# Patient Record
Sex: FEMALE | Race: WHITE | URBAN - METROPOLITAN AREA
[De-identification: names, ages, dates, MRNs, and addresses within clinical notes are randomized per-mention and may not be internally consistent; named-entity substitution may affect disease eponyms.]

---

## 2017-09-14 ENCOUNTER — OFFICE VISIT (OUTPATIENT)
Dept: ORTHOPEDIC SURGERY | Age: 57
End: 2017-09-14

## 2017-09-14 VITALS
DIASTOLIC BLOOD PRESSURE: 70 MMHG | HEART RATE: 90 BPM | WEIGHT: 175.04 LBS | BODY MASS INDEX: 29.88 KG/M2 | HEIGHT: 64 IN | SYSTOLIC BLOOD PRESSURE: 111 MMHG

## 2017-09-14 DIAGNOSIS — M67.971 ACHILLES TENDON DISORDER, RIGHT: ICD-10-CM

## 2017-09-14 DIAGNOSIS — M79.672 FOOT PAIN, LEFT: Primary | ICD-10-CM

## 2017-09-14 PROCEDURE — 99213 OFFICE O/P EST LOW 20 MIN: CPT | Performed by: ORTHOPAEDIC SURGERY

## 2017-09-14 PROCEDURE — 73620 X-RAY EXAM OF FOOT: CPT | Performed by: ORTHOPAEDIC SURGERY

## 2019-05-04 ENCOUNTER — OFFICE VISIT (OUTPATIENT)
Dept: ORTHOPEDIC SURGERY | Age: 59
End: 2019-05-04
Payer: COMMERCIAL

## 2019-05-04 VITALS — HEIGHT: 65 IN | WEIGHT: 175 LBS | RESPIRATION RATE: 16 BRPM | BODY MASS INDEX: 29.16 KG/M2

## 2019-05-04 DIAGNOSIS — M25.571 RIGHT ANKLE PAIN, UNSPECIFIED CHRONICITY: Primary | ICD-10-CM

## 2019-05-04 DIAGNOSIS — S93.491A SPRAIN OF ANTERIOR TALOFIBULAR LIGAMENT OF RIGHT ANKLE, INITIAL ENCOUNTER: ICD-10-CM

## 2019-05-04 PROCEDURE — 1036F TOBACCO NON-USER: CPT | Performed by: NURSE PRACTITIONER

## 2019-05-04 PROCEDURE — G8419 CALC BMI OUT NRM PARAM NOF/U: HCPCS | Performed by: NURSE PRACTITIONER

## 2019-05-04 PROCEDURE — G8427 DOCREV CUR MEDS BY ELIG CLIN: HCPCS | Performed by: NURSE PRACTITIONER

## 2019-05-04 PROCEDURE — 99213 OFFICE O/P EST LOW 20 MIN: CPT | Performed by: NURSE PRACTITIONER

## 2019-05-04 PROCEDURE — 3017F COLORECTAL CA SCREEN DOC REV: CPT | Performed by: NURSE PRACTITIONER

## 2019-05-04 RX ORDER — ESTRADIOL 2 MG/1
2 TABLET ORAL DAILY
COMMUNITY

## 2019-05-04 RX ORDER — METHYLPREDNISOLONE 4 MG/1
TABLET ORAL
Qty: 1 KIT | Refills: 0 | Status: SHIPPED | OUTPATIENT
Start: 2019-05-04 | End: 2019-05-04 | Stop reason: CLARIF

## 2019-05-04 RX ORDER — PANTOPRAZOLE SODIUM 20 MG/1
20 TABLET, DELAYED RELEASE ORAL DAILY
COMMUNITY

## 2019-05-04 RX ORDER — LEVOTHYROXINE AND LIOTHYRONINE 38; 9 UG/1; UG/1
60 TABLET ORAL DAILY
COMMUNITY

## 2019-05-04 RX ORDER — FOLIC ACID 1 MG/1
1 TABLET ORAL DAILY
COMMUNITY

## 2019-05-04 RX ORDER — GABAPENTIN 100 MG/1
100 CAPSULE ORAL 3 TIMES DAILY
COMMUNITY

## 2019-05-04 RX ORDER — PRASTERONE (DHEA) 50 MG
CAPSULE ORAL
COMMUNITY

## 2019-05-04 RX ORDER — MELOXICAM 15 MG/1
15 TABLET ORAL DAILY
COMMUNITY

## 2019-05-04 NOTE — PROGRESS NOTES
CHIEF COMPLAINT:    Chief Complaint   Patient presents with    Ankle Pain     Right       HISTORY OF PRESENT ILLNESS:                The patient is a 64 y.o. female who is here for evaluation of right ankle pain. He said his anemia and states yesterday she was carrying laundry and missed 2 steps. She states she landed on her front side. She states she initially had some pain in her right ankle with some swelling followed by bruising. She states she has pain with severe flexion and extension. She states she is able walk on it as long as she keeps her ankle still she does not have a lot of discomfort. She states she is prediabetic.   Past Medical History:   Diagnosis Date    Back injury     Pericarditis 2013    Had on four different occaions last one 2013    Thyroid disease           The pain assessment was noted & is as follows:  Pain Assessment  Location of Pain: Ankle  Location Modifiers: Right, Lateral  Severity of Pain: 9  Quality of Pain: Sharp  Duration of Pain: A few days  Frequency of Pain: Constant  Date Pain First Started: 05/03/19  Aggravating Factors: Walking, Standing, Squatting  Limiting Behavior: Yes  Result of Injury: Yes  Work-Related Injury: No  Are there other pain locations you wish to document?: No]      Work Status/Functionality:     Past Medical History: Medical history form was reviewed today & can be found in the media tab  Past Medical History:   Diagnosis Date    Back injury     Pericarditis 2013    Had on four different occaions last one 2013    Thyroid disease       Past Surgical History:     Past Surgical History:   Procedure Laterality Date    ACHILLES TENDON SURGERY Left 2/9/15    left achillesdebrid, haglands excisflexor, hallicis longus transfer    ADENOIDECTOMY      FOOT SURGERY      bilat heels    MANDIBLE FRACTURE SURGERY      OTHER SURGICAL HISTORY      left trigger thumb    SPINE SURGERY      11 total spinal surgeries    TONSILLECTOMY       Current Medications:     Current Outpatient Medications:     folic acid (FOLVITE) 1 MG tablet, Take 1 mg by mouth daily, Disp: , Rfl:     meloxicam (MOBIC) 15 MG tablet, Take 15 mg by mouth daily, Disp: , Rfl:     pantoprazole (PROTONIX) 20 MG tablet, Take 20 mg by mouth daily, Disp: , Rfl:     metFORMIN (GLUCOPHAGE) 500 MG tablet, Take 500 mg by mouth 2 times daily (with meals), Disp: , Rfl:     Progesterone 200 MG SUPP, Place vaginally, Disp: , Rfl:     DHEA 50 MG CAPS, Take by mouth, Disp: , Rfl:     estradiol (ESTRACE) 2 MG tablet, Take 2 mg by mouth daily, Disp: , Rfl:     gabapentin (NEURONTIN) 100 MG capsule, Take 100 mg by mouth 3 times daily. , Disp: , Rfl:     thyroid (ARMOUR) 60 MG tablet, Take 60 mg by mouth daily, Disp: , Rfl:     Calcium Carbonate-Vitamin D (CALCIUM + D PO), Take  by mouth daily. , Disp: , Rfl:     DULoxetine (CYMBALTA) 60 MG capsule, Take 60 mg by mouth 2 times daily , Disp: , Rfl:     valACYclovir (VALTREX) 500 MG tablet, Take 500 mg by mouth daily. , Disp: , Rfl:     oxyCODONE-acetaminophen (PERCOCET) 5-325 MG per tablet, Take 1 tablet by mouth 2 times daily Oh CTP RX [id number], Disp: 60 tablet, Rfl: 0    oxyCODONE-acetaminophen (PERCOCET) 5-325 MG per tablet, Take 1 tablet by mouth 2 times daily Oh CTP RX [id number] Fill after 9/28/16, Disp: 60 tablet, Rfl: 0    methocarbamol (ROBAXIN) 750 MG tablet, Take 750 mg by mouth 4 times daily as needed. , Disp: , Rfl:   Allergies:  Vicodin [hydrocodone-acetaminophen] and Codeine  Social History:    reports that she has never smoked. She has never used smokeless tobacco. She reports that she drinks alcohol. She reports that she does not use drugs. Family History:   Family History   Problem Relation Age of Onset    Cancer Father     Substance Abuse Brother        REVIEW OF SYSTEMS:   For new problems, a full review of systems will be found scanned in the patient's chart.   CONSTITUTIONAL: Denies unexplained weight loss, fevers, chills NEUROLOGICAL: Denies unsteady gait or progressive weakness  SKIN: Denies skin changes, delayed healing, rash, itching       PHYSICAL EXAM:    Vitals: Resp. rate 16, height 5' 5\" (1.651 m), weight 175 lb (79.4 kg), not currently breastfeeding. GENERAL EXAM:  · General Apparence: Patient is adequately groomed with no evidence of malnutrition. · Orientation: The patient is oriented to time, place and person. · Mood & Affect:The patient's mood and affect are appropriate       Right ankle PHYSICAL EXAMINATION:  · Inspection:  No visual deformity. Moderate effusion and mild ecchymosis. No erythema. · Palpation:  Tenderness to palpation lateral malleolus      · Range of Motion: Range of motion limited due to swelling and discomfort    · Strength: No strength deficits    · Special Tests:  EHL intact. Capillary refill less than 3 seconds. Negative Homans. Discomfort with plantarflexion and dorsiflexion abduction and adduction            · Skin:  There are no rashes, ulcerations or lesions. · There are no dysvascular changes     Gait & station: antalgic      Additional Examinations:        Left Lower Extremity: Examination of the left lower extremity does not show any tenderness, deformity or injury. Range of motion is unremarkable. There is no gross instability. There are no rashes, ulcerations or lesions. Strength and tone are normal.      Diagnostic Testing: The following x rays were read and interpreted by myself      1. 3 views of right ankle shows    Orders     Orders Placed This Encounter   Procedures    XR ANKLE RIGHT (MIN 3 VIEWS)     Standing Status:   Future     Number of Occurrences:   1     Standing Expiration Date:   6/4/2019         Assessment / Treatment Plan:     1. Right ankle sprain    At this point patient was advised to take OTC ibuprofen alternating with Tylenol if no contraindications as directed per package insert.   Patient was offered a tall walking boot which she would like to try to get off-line at this time due to the cost here at the clinic. Patient advised to continue to rest, ice, compress with Ace wrap and elevate. She will follow up in 2 weeks with one of our foot and ankle specialists [place]. Patient understands and agrees with plan of care.

## 2022-10-18 ENCOUNTER — HOSPITAL ENCOUNTER (OUTPATIENT)
Dept: PHYSICAL THERAPY | Age: 62
Setting detail: THERAPIES SERIES
Discharge: HOME | End: 2022-10-18
Payer: COMMERCIAL

## 2022-10-18 PROCEDURE — 97112 NEUROMUSCULAR REEDUCATION: CPT

## 2022-10-18 PROCEDURE — 97161 PT EVAL LOW COMPLEX 20 MIN: CPT
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
## 2022-10-18 NOTE — FLOWSHEET NOTE
Physical Therapy Aquatic Flow Sheet  Date:  10/18/2022    Patient Name:  Rikki Lee    Restrictions:    Medical/Treatment Diagnosis Information:   Intertrochanteric fracture of left femur, closed, with delayed healing, subsequent encounter [Q87.289O]  Insurance/Certification information:   Med [place]  Physician Information:   Charisse Davidson MD  Plan of care signed (Y/N):  N  Visit# / total visits:  1/12    Pain level: /10   Electronically signed by:  Christ Armendariz PT, PT    Medicare Cap (if applicable):  N/a = total amount used, updated 10/18/2022    Key  B= Belt DB= Dumbells T= Theratube   H= Hydrotone N= Noodles W= Weights   P= Paddles S= Speedo equipment K= Kickboard     Exercises/Activities   Warm-up/Amb    Exercises      Slow forward  nv  HR/TR  nv    Slow sideways  nv  Marches  nv    Slow backwards  nv  Mini-squats  nv    Medium forward    4-way SLR  nv    Medium sideways    Hip circles/fig 8  nv    Small shuffle    Hamstring curls  nv    Jog    Knee extension  nv    Braiding    Pelvic tilts  nv    Bicycling  nv  Scap squeezes          Shoulder flex/ext      Functional    Shoulder abd/add      Step    Shoulder H. abd/add      Lifting    Shoulder IR/ER      Hand to opp knee    Rowing      Push down squat    Bilateral pull down      UE PNF    Push/pull      LE PNF    Push downs      Wall push ups    Arm circles      SLS  nv  Elbow flex/ext          Chin tuck      Stretching    UT shrugs/rolls      Gastroc/Soleus  nv  Rocking horse      Hamstring          SKTC  nv  Other      Piriformis    tandem  nv    Hip flexor          Ladder pull          Pec stretch          Post deltoid           Time In:      Timed Code Treatment Minutes:       Total Treatment Minutes:      Treatment/Activity Tolerance:   [] Patient tolerated treatment well [] Patient limited by fatigue   [] Patient limited by pain [] Patient limited by other medical complications  [] Other:     Prognosis: [] Good [] Fair  [] Poor    Patient Requires Follow-up:  [] Yes  [] No    Plan: [] Continue per plan of care [] Alter current plan (see comments)   [] Plan of care initiated [] Hold pending MD visit [] Discharge    See Weekly Progress Note: [] Yes  [] No  Next due:

## 2022-10-20 ENCOUNTER — HOSPITAL ENCOUNTER (OUTPATIENT)
Dept: PHYSICAL THERAPY | Age: 62
Setting detail: THERAPIES SERIES
Discharge: HOME | End: 2022-10-20
Payer: COMMERCIAL

## 2022-10-20 PROCEDURE — 97150 GROUP THERAPEUTIC PROCEDURES: CPT

## 2022-10-20 PROCEDURE — 97113 AQUATIC THERAPY/EXERCISES: CPT

## 2022-10-20 NOTE — FLOWSHEET NOTE
[facility], [facility], [address] [place] [address], [address] [place] [address]   Phone: [phone]  Fax [phone]      Physical Therapy Aquatic Flow Sheet  Date:  10/20/2022    Patient Name:  Inna Cramer    Restrictions:    Medical/Treatment Diagnosis Information:   Intertrochanteric fracture of left femur, closed, with delayed healing, subsequent encounter [Y07.805X]  Insurance/Certification information:   Med [place]  Physician Information:   Jamarcus Buckley MD  Plan of care signed (Y/N):  N  Visit# / total visits:  2/12    Pain level: 2/10   Electronically signed by:  Isabell Cai, PT, DPT    Medicare Cap (if applicable):  N/a = total amount used, updated 10/20/2022    Key  B= Belt DB= Dumbells T= Theratube   H= Hydrotone N= Noodles W= Weights   P= Paddles S= Speedo equipment K= Kickboard     Exercises/Activities   Warm-up/Amb    Exercises      Slow forward  x2laps  HR/TR  x10B    Slow sideways  x2laps  Marches  x10    Slow backwards  x2laps  Mini-squats  nv    Medium forward    3-way SLR  x10B    Medium sideways    Hip circles/fig 8  x10B    Small shuffle    Hamstring curls  x10B    Jog    Knee extension  x10B    Braiding    Pelvic tilts  5\"x10    Bicycling  x2min  Scap squeezes          Shoulder flex/ext      Functional    Shoulder abd/add      Step    Shoulder H. abd/add      Lifting    Shoulder IR/ER      Hand to opp knee    Rowing      Push down squat    Bilateral pull down      UE PNF    Push/pull      LE PNF    Push downs      Wall push ups    Arm circles      SLS  nv  Elbow flex/ext          Chin tuck      Stretching    UT shrugs/rolls      Gastroc/Soleus  [id number] B  Rocking horse      Hamstring  [id number] B        SKTC  nv  Other      Piriformis    tandem  [id number] B    Hip flexor          Ladder pull          Pec stretch          Post deltoid           Time In:  2:10pm    Timed Code Treatment Minutes:  15min.     Total Treatment Minutes:  35min (1 group, 1 aquatic individual TE)    Treatment/Activity Tolerance:   [x] Patient tolerated treatment well [] Patient limited by fatigue   [] Patient limited by pain [] Patient limited by other medical complications  [] Other:     Prognosis: [x] Good [] Fair  [] Poor    Patient Requires Follow-up:  [x] Yes  [] No    Plan: [x] Continue per plan of care [] Alter current plan (see comments)   [] Plan of care initiated [] Hold pending MD visit [] Discharge    See Weekly Progress Note: [] Yes  [x] No  Next due:

## 2022-10-25 ENCOUNTER — HOSPITAL ENCOUNTER (OUTPATIENT)
Dept: PHYSICAL THERAPY | Age: 62
Setting detail: THERAPIES SERIES
Discharge: HOME | End: 2022-10-25
Payer: COMMERCIAL

## 2022-10-25 PROCEDURE — 97150 GROUP THERAPEUTIC PROCEDURES: CPT

## 2022-10-25 PROCEDURE — 97113 AQUATIC THERAPY/EXERCISES: CPT

## 2022-10-25 NOTE — FLOWSHEET NOTE
[facility], [facility], [address] [place] [address]   Phone: [phone]  Fax [phone]      Physical Therapy Aquatic Flow Sheet  Date:  10/25/2022    Patient Name:  Diego Salvador    Restrictions:    Medical/Treatment Diagnosis Information:   Intertrochanteric fracture of left femur, closed, with delayed healing, subsequent encounter [T24.951Y]  Insurance/Certification information:   Med [place]  Physician Information:   Alicia Fan MD  Plan of care signed (Y/N):  N  Visit# / total visits:  3/12    Pain level: 2/10   Electronically signed by:  Arturo Cohn, PT, DPT    Medicare Cap (if applicable):  N/a = total amount used, updated 10/25/2022    Key  B= Belt DB= Dumbells T= Theratube   H= Hydrotone N= Noodles W= Weights   P= Paddles S= Speedo equipment K= Kickboard     Exercises/Activities   Warm-up/Amb    Exercises      Slow forward  x2laps  HR/TR  x10B    Slow sideways  x2laps  Marches  x10    Slow backwards  x2laps  Mini-squats  x20    Medium forward    3-way SLR  x15B    Medium sideways    Hip circles/fig 8  x15B    Small shuffle    Hamstring curls  x15B    Jog    Knee extension  x15B    Braiding    Pelvic tilts  5\"x10    Bicycling  x2min  Scap squeezes          Shoulder flex/ext      Functional    Shoulder abd/add      Step    Shoulder H. abd/add      Lifting    Shoulder IR/ER      Hand to opp knee    Rowing      Push down squat    Bilateral pull down      UE PNF    Push/pull      LE PNF    Push downs      Wall push ups    Arm circles      SLS  nv  Elbow flex/ext          Chin tuck      Stretching    UT shrugs/rolls      Gastroc/Soleus  [id number] B  Rocking horse      Hamstring  [id number] B        SKTC  nv  Other      Piriformis    tandem  [id number] B    Hip flexor          Ladder pull          Pec stretch          Post deltoid           Time In:  2:10pm    Timed Code Treatment Minutes:  15min.     Total Treatment Minutes: 35min (1 group, 1 aquatic individual TE)    Treatment/Activity Tolerance:   [x] Patient tolerated treatment well [] Patient limited by fatigue   [] Patient limited by pain [] Patient limited by other medical complications  [] Other:     Prognosis: [x] Good [] Fair  [] Poor    Patient Requires Follow-up:  [x] Yes  [] No    Plan: [x] Continue per plan of care [] Alter current plan (see comments)   [] Plan of care initiated [] Hold pending MD visit [] Discharge    See Weekly Progress Note: [] Yes  [x] No  Next due:

## 2022-10-27 ENCOUNTER — HOSPITAL ENCOUNTER (OUTPATIENT)
Dept: PHYSICAL THERAPY | Age: 62
Setting detail: THERAPIES SERIES
Discharge: HOME | End: 2022-10-27
Payer: COMMERCIAL

## 2022-10-27 PROCEDURE — 97113 AQUATIC THERAPY/EXERCISES: CPT

## 2022-10-27 PROCEDURE — 97150 GROUP THERAPEUTIC PROCEDURES: CPT

## 2022-10-27 NOTE — FLOWSHEET NOTE
[facility], [facility], [address] [place] [address], [address] [place] [address]   Phone: [phone]  Fax [phone]      Physical Therapy Aquatic Flow Sheet  Date:  10/27/2022    Patient Name:  Ophelia Shaw    Restrictions:    Medical/Treatment Diagnosis Information:   Intertrochanteric fracture of left femur, closed, with delayed healing, subsequent encounter [J24.261V]  Insurance/Certification information:   Med [facility]  Physician Information:   Samara Harden MD  Plan of care signed (Y/N):  N  Visit# / total visits:  4/12    Pain level: 0/10   Electronically signed by:  Terrell Freeman, PT, DPT    Medicare Cap (if applicable):  N/a = total amount used, updated 10/27/2022    Key  B= Belt DB= Dumbells T= Theratube   H= Hydrotone N= Noodles W= Weights   P= Paddles S= Speedo equipment K= Kickboard     Exercises/Activities   Warm-up/Amb    Exercises      Slow forward  x2laps  HR/TR  x15B    Slow sideways  x2laps  Marches  x2min    Slow backwards  x2laps  Mini-squats  x20    Medium forward    3-way SLR  x15B    Medium sideways    Hip circles/fig 8  x15B    Small shuffle    Hamstring curls  x15B    Jog    Knee extension  x15B    Braiding    Pelvic tilts  5\"x10    Bicycling  x2min  Scap squeezes          Shoulder flex/ext  x10B w/ TA    Functional    Shoulder abd/add  x10B w/ TA    Step    Shoulder H. abd/add  x10B w/ TA    Lifting    Shoulder IR/ER      Hand to opp knee    Rowing      Push down squat    Bilateral pull down      UE PNF    Push/pull      LE PNF    Push downs      Wall push ups    Arm circles      SLS    Elbow flex/ext          Chin tuck      Stretching    UT shrugs/rolls      Gastroc/Soleus  [id number] B  Rocking horse      Hamstring  [id number] B        SKTC  nv  Other      Piriformis    tandem  [id number] B    Hip flexor          Ladder pull          Pec stretch          Post deltoid           Time In:  2:00pm    Timed Code Treatment Minutes: 15min.     Total Treatment Minutes:  45min (1 group, 1 aquatic individual TE)    Treatment/Activity Tolerance:   [x] Patient tolerated treatment well [] Patient limited by fatigue   [] Patient limited by pain [] Patient limited by other medical complications  [] Other:     Prognosis: [x] Good [] Fair  [] Poor    Patient Requires Follow-up:  [x] Yes  [] No    Plan: [x] Continue per plan of care [] Alter current plan (see comments)   [] Plan of care initiated [] Hold pending MD visit [] Discharge    See Weekly Progress Note: [] Yes  [x] No  Next due:

## 2022-11-01 ENCOUNTER — HOSPITAL ENCOUNTER (OUTPATIENT)
Dept: PHYSICAL THERAPY | Age: 62
Setting detail: THERAPIES SERIES
Discharge: HOME | End: 2022-11-01
Payer: COMMERCIAL

## 2022-11-01 PROCEDURE — 97150 GROUP THERAPEUTIC PROCEDURES: CPT

## 2022-11-01 PROCEDURE — 97113 AQUATIC THERAPY/EXERCISES: CPT

## 2022-11-01 NOTE — FLOWSHEET NOTE
[facility], [facility], [address] [place] [address] Dr  Phone: [phone]  Fax [phone]      Physical Therapy Aquatic Flow Sheet  Date:  11/1/2022    Patient Name:  Candelaria Fuentes    Restrictions:    Medical/Treatment Diagnosis Information:   Intertrochanteric fracture of left femur, closed, with delayed healing, subsequent encounter [J00.351L]  Insurance/Certification information:   Med [place]  Physician Information:   Sunshine Biswas MD  Plan of care signed (Y/N):  N  Visit# / total visits:  5/12    Pain level: 0/10   Electronically signed by:  To Vasquez, PT, DPT    Medicare Cap (if applicable):  N/a = total amount used, updated 11/1/2022    Key  B= Belt DB= Dumbells T= Theratube   H= Hydrotone N= Noodles W= Weights   P= Paddles S= Speedo equipment K= Kickboard     Exercises/Activities   Warm-up/Amb    Exercises      Slow forward  x2laps  HR/TR  x15B    Slow sideways  x2laps  Marches  x2min    Slow backwards  x2laps  Mini-squats  x20    Medium forward    3-way SLR  x15B    Medium sideways    Hip circles/fig 8  x15B    Small shuffle    Hamstring curls  x15B    Jog    Knee extension  x15B    Braiding    Pelvic tilts  5\"x10    Bicycling  x2min  Scap squeezes          Shoulder flex/ext  x10B w/ TA    Functional    Shoulder abd/add  x10B w/ TA    Step    Shoulder H. abd/add  x10B w/ TA    Lifting    Shoulder IR/ER      Hand to opp knee    Rowing      Push down squat    Bilateral pull down      UE PNF    Push/pull      LE PNF    Push downs      Wall push ups    Arm circles      SLS    Elbow flex/ext          Chin tuck      Stretching    UT shrugs/rolls      Gastroc/Soleus  [id number] B  Rocking horse      Hamstring  [id number] B        SKTC  nv  Other      Piriformis    tandem  [id number] B    Hip flexor          Ladder pull          Pec stretch          Post deltoid           Time In:  2:00pm    Timed Code Treatment Minutes: 15min.     Total Treatment Minutes:  45min (1 group, 1 aquatic individual TE)    Treatment/Activity Tolerance:   [x] Patient tolerated treatment well [] Patient limited by fatigue   [] Patient limited by pain [] Patient limited by other medical complications  [] Other:     Prognosis: [x] Good [] Fair  [] Poor    Patient Requires Follow-up:  [x] Yes  [] No    Plan: [x] Continue per plan of care [] Alter current plan (see comments)   [] Plan of care initiated [] Hold pending MD visit [] Discharge    See Weekly Progress Note: [] Yes  [x] No  Next due:

## 2022-11-03 ENCOUNTER — HOSPITAL ENCOUNTER (OUTPATIENT)
Dept: PHYSICAL THERAPY | Age: 62
Setting detail: THERAPIES SERIES
Discharge: HOME | End: 2022-11-03
Payer: COMMERCIAL

## 2022-11-03 PROCEDURE — 97150 GROUP THERAPEUTIC PROCEDURES: CPT

## 2022-11-03 PROCEDURE — 97113 AQUATIC THERAPY/EXERCISES: CPT

## 2022-11-03 NOTE — FLOWSHEET NOTE
[facility], [facility], [address] [place] [address] Dr  Phone: [phone]  Fax [phone]      Physical Therapy Aquatic Flow Sheet  Date:  11/3/2022    Patient Name:  Rikki Ford    Restrictions:    Medical/Treatment Diagnosis Information:   Intertrochanteric fracture of left femur, closed, with delayed healing, subsequent encounter [M82.229T]  Insurance/Certification information:   Med [place]  Physician Information:   Benedict Schwab, MD  Plan of care signed (Y/N):  N  Visit# / total visits:  6/12    Pain level: 0/10   Electronically signed by:  Rey Hernández PT, DPT    Medicare Cap (if applicable):  N/a = total amount used, updated 11/3/2022    Key  B= Belt DB= Dumbells T= Theratube   H= Hydrotone N= Noodles W= Weights   P= Paddles S= Speedo equipment K= Kickboard     Exercises/Activities   Warm-up/Amb    Exercises      Slow forward  x2laps  HR/TR  x15B    Slow sideways  x2laps  Marches  x2min    Slow backwards  x2laps  Mini-squats  x20    Medium forward    3-way SLR  x20B    Medium sideways    Hip circles/fig 8  x20B    Small shuffle    Hamstring curls  x15B    Jog    Knee extension  x15B    Braiding    Pelvic tilts  5\"x10    Bicycling  x2min  Scap squeezes  5\"x10        Shoulder flex/ext  x15B w/ TA    Functional    Shoulder abd/add  x15B w/ TA    Step    Shoulder H. abd/add  x15B w/ TA    Lifting    Shoulder IR/ER      Hand to opp knee    Rowing      Push down squat    Bilateral pull down      UE PNF    Push/pull      LE PNF    Push downs      Wall push ups    Arm circles      SLS    Elbow flex/ext          Chin tuck      Stretching    UT shrugs/rolls      Gastroc/Soleus  [id number] B  Rocking horse      Hamstring  [id number] B        SKTC  nv  Other      Piriformis    tandem  [id number] B    Hip flexor          Ladder pull          Pec stretch          Post deltoid           Time In:  2:00pm    Timed Code Treatment Minutes: 15min.     Total Treatment Minutes:  35min (1 group, 1 aquatic individual TE)    Treatment/Activity Tolerance:   [x] Patient tolerated treatment well [] Patient limited by fatigue   [] Patient limited by pain [] Patient limited by other medical complications  [] Other:     Prognosis: [x] Good [] Fair  [] Poor    Patient Requires Follow-up:  [x] Yes  [] No    Plan: [x] Continue per plan of care [] Alter current plan (see comments)   [] Plan of care initiated [] Hold pending MD visit [] Discharge    See Weekly Progress Note: [] Yes  [x] No  Next due:

## 2022-11-08 ENCOUNTER — HOSPITAL ENCOUNTER (OUTPATIENT)
Dept: PHYSICAL THERAPY | Age: 62
Setting detail: THERAPIES SERIES
Discharge: HOME | End: 2022-11-08
Payer: COMMERCIAL

## 2022-11-08 PROCEDURE — 97113 AQUATIC THERAPY/EXERCISES: CPT

## 2022-11-08 PROCEDURE — 97150 GROUP THERAPEUTIC PROCEDURES: CPT

## 2022-11-08 NOTE — FLOWSHEET NOTE
[facility], [facility], [address] [place] [address] Dr  Phone: [phone]  Fax [phone]      Physical Therapy Aquatic Flow Sheet  Date:  11/8/2022    Patient Name:  Baltazar Vargas    Restrictions:  Pt has a certified service dog that she brings with her  Medical/Treatment Diagnosis Information:   Intertrochanteric fracture of left femur, closed, with delayed healing, subsequent encounter [U08.313C]  Insurance/Certification information:   Med [place]  Physician Information:   Kade Brown MD  Plan of care signed (Y/N):  N  Visit# / total visits:  7/12    Pain level: 0/10   Electronically signed by:  Han Potts PT, DPT    Medicare Cap (if applicable):  N/a = total amount used, updated 11/8/2022    Key  B= Belt DB= Dumbells T= Theratube   H= Hydrotone N= Noodles W= Weights   P= Paddles S= Speedo equipment K= Kickboard     Exercises/Activities   Warm-up/Amb    Exercises      Slow forward  x2laps  HR/TR  x15B    Slow sideways  x2laps  Marches  x2min    Slow backwards  x2laps  Mini-squats  x20    Medium forward    3-way SLR  x20B    Medium sideways    Hip circles/fig 8  x20B    Small shuffle    Hamstring curls  x15B    Jog    Knee extension  x15B    Braiding    Pelvic tilts  5\"x10    Bicycling  x2min  Scap squeezes  5\"x10        Shoulder flex/ext  x15B w/ TA    Functional    Shoulder abd/add  x15B w/ TA    Step    Shoulder H. abd/add  x15B w/ TA    Lifting    Shoulder IR/ER      Hand to opp knee    Rowing      Push down squat    Bilateral pull down      UE PNF    Push/pull      LE PNF    Push downs      Wall push ups    Arm circles      SLS    Elbow flex/ext          Chin tuck      Stretching    UT shrugs/rolls      Gastroc/Soleus  [id number] B  Rocking horse      Hamstring  [id number] B        SKTC  nv  Other      Piriformis    tandem  [id number] B    Hip flexor          Ladder pull          Pec stretch          Post deltoid Time In:  2:00pm    Timed Code Treatment Minutes:  15min.     Total Treatment Minutes:  35min (1 group, 1 aquatic individual TE)    Treatment/Activity Tolerance:   [x] Patient tolerated treatment well [] Patient limited by fatigue   [] Patient limited by pain [] Patient limited by other medical complications  [] Other:     Prognosis: [x] Good [] Fair  [] Poor    Patient Requires Follow-up:  [x] Yes  [] No    Plan: [x] Continue per plan of care [] Alter current plan (see comments)   [] Plan of care initiated [] Hold pending MD visit [] Discharge    See Weekly Progress Note: [] Yes  [x] No  Next due:

## 2022-11-10 ENCOUNTER — HOSPITAL ENCOUNTER (OUTPATIENT)
Dept: PHYSICAL THERAPY | Age: 62
Setting detail: THERAPIES SERIES
Discharge: HOME | End: 2022-11-10
Payer: COMMERCIAL

## 2022-11-10 PROCEDURE — 97150 GROUP THERAPEUTIC PROCEDURES: CPT

## 2022-11-10 PROCEDURE — 97113 AQUATIC THERAPY/EXERCISES: CPT

## 2022-11-10 NOTE — FLOWSHEET NOTE
stretch          Post deltoid           Time In:  2:10pm    Timed Code Treatment Minutes:  15min.     Total Treatment Minutes:  40min (1 group, 1 aquatic individual TE)    Treatment/Activity Tolerance:   [x] Patient tolerated treatment well [] Patient limited by fatigue   [] Patient limited by pain [] Patient limited by other medical complications  [] Other:     Prognosis: [x] Good [] Fair  [] Poor    Patient Requires Follow-up:  [x] Yes  [] No    Plan: [x] Continue per plan of care [] Alter current plan (see comments)   [] Plan of care initiated [] Hold pending MD visit [] Discharge    See Weekly Progress Note: [] Yes  [x] No  Next due:

## 2022-11-15 ENCOUNTER — HOSPITAL ENCOUNTER (OUTPATIENT)
Dept: PHYSICAL THERAPY | Age: 62
Setting detail: THERAPIES SERIES
Discharge: HOME | End: 2022-11-15
Payer: COMMERCIAL

## 2022-11-15 PROCEDURE — 97110 THERAPEUTIC EXERCISES: CPT

## 2022-11-15 PROCEDURE — 97150 GROUP THERAPEUTIC PROCEDURES: CPT

## 2022-11-15 PROCEDURE — 97113 AQUATIC THERAPY/EXERCISES: CPT

## 2022-11-15 NOTE — FLOWSHEET NOTE
[facility], [facility], [address] [place] [address] Dr  Phone: [phone]  Fax [phone]      Physical Therapy Aquatic Flow Sheet  Date:  11/15/2022    Patient Name:  Brenna Mckeon    Restrictions:  Pt has a certified service dog that she brings with her  Medical/Treatment Diagnosis Information:   Intertrochanteric fracture of left femur, closed, with delayed healing, subsequent encounter [G05.130E]  Insurance/Certification information:   Med [place]  Physician Information:   Reed Angulo MD  Plan of care signed (Y/N):  N  Visit# / total visits:  9/12    Pain level: 0/10   Electronically signed by:  Mitul Carranza PT, DPT    Medicare Cap (if applicable):  N/a = total amount used, updated 11/15/2022    Key  B= Belt DB= Dumbells T= Theratube   H= Hydrotone N= Noodles W= Weights   P= Paddles S= Speedo equipment K= Kickboard     Exercises/Activities   Warm-up/Amb    Exercises      Slow forward  x2laps  HR/TR  x15B    Slow sideways  x2laps  Marches  x2min    Slow backwards  x2laps  Mini-squats  x20    Medium forward    3-way SLR  x20B    Medium sideways    Hip circles/fig 8  x20B    Small shuffle    Hamstring curls  x20B    Jog    Knee extension  x20B    Braiding    Pelvic tilts  5\"x10    Bicycling  x2min  Scap squeezes  5\"x10        Shoulder flex/ext  x15B w/ TA w/ paddles    Functional    Shoulder abd/add  x15B w/ TAw/ paddles    Step    Shoulder H. abd/add  x15B w/ TAw/ paddles    Lifting    Shoulder IR/ER      Hand to opp knee    Rowing      Push down squat    Bilateral pull down      UE PNF    Push/pull      LE PNF    Push downs      Wall push ups    Arm circles      SLS    Elbow flex/ext          Chin tuck      Stretching    UT shrugs/rolls      Gastroc/Soleus  [id number] B  Rocking horse      Hamstring  [id number] B        SKTC  2x30 sec B  Other      Piriformis    tandem  1b36yog B    Hip flexor          Ladder pull Pec stretch          Post deltoid           Time In:  2:10pm    Timed Code Treatment Minutes:  15min.     Total Treatment Minutes:  40min (1 group, 1 aquatic individual TE)    Treatment/Activity Tolerance:   [x] Patient tolerated treatment well [] Patient limited by fatigue   [] Patient limited by pain [] Patient limited by other medical complications  [] Other:     Prognosis: [x] Good [] Fair  [] Poor    Patient Requires Follow-up:  [x] Yes  [] No    Plan: [x] Continue per plan of care [] Alter current plan (see comments)   [] Plan of care initiated [] Hold pending MD visit [] Discharge    See Weekly Progress Note: [] Yes  [x] No  Next due:

## 2022-11-15 NOTE — PROGRESS NOTES
[facility], [place] [address] [address]  [address] [place] [address], [address] [place] [address]  Phone: [phone]  Fax [phone]      Dear Referring Practitioner: Dr. Jenny Rosas,     We had the pleasure of evaluating the following patient for physical therapy services at [facility]. A summary of our findings can be found in the initial assessment below. This includes our plan of care. If you have any questions or concerns regarding these findings, please do not hesitate to contact me at the office phone number. Thank you for the referral.       Physician Signature:_______________________________Date:__________________  By signing above (or electronic signature), therapists plan is approved by physician      Physical Therapy Daily Treatment/Progress Note    Date:  11/15/2022    Patient Name:  Jane Burton    :  1960  MRN: [id number]  Restrictions/Precautions:    Medical/Treatment Diagnosis Information:   Intertrochanteric fracture of left femur, closed, with delayed healing, subsequent encounter [W18.673L]  Insurance/Certification information:   [facility]  Physician Information:   Ruben Ledezma MD  Plan of care signed (Y/N):  N  Visit# / total visits:       G-Code (if applicable):          LEFS  22/80  10/18/22    3680  11/15/22    Medicare Cap (if applicable):  N/a = total amount used, updated 11/15/2022    Time in:   3:30      Timed Treatment: 45  Total Treatment Time:  45  Time out: 4:15  ________________________________________________________________________________________    Pain Level:    /10  SUBJECTIVE:  Pt reports that her hip is feeling better, but that she still notices a significant limp with walking. The water exercise is doing very well.      OBJECTIVE: [id number]    Exercise/Equipment Resistance/Repetitions Other comments          SLR    HEP   Bridge  HEP   Side-lying hip ABD  HEP   clamshell  HEP tandem  HEP   minisquat  HEP   Standing hip ABD  HEP                                                                              Other Therapeutic Activities:      Manual Treatments:         Modalities:      Test/Measurements:      Palpation/Tenderness/Visual Inspection       TTP lateral hip                  Gait/Steps/Balance    []   WFL         [x]    Dysfunction Noted.    Comment: severe trendelenberg on L with trunk lean same side, hip ADD/IR, and poor propulsion        TUG: 10 seconds with significant trendelenberg noted      []  All balance WFL unless otherwise noted below:  Single limb stance: 2 seconds on L, 5+ seconds on R (At eval: unable on L)  Squat: WFL (At eval: quad dominant)     Lumbar Range of Motion/Strength Testing      [x] All WFL except as marked below  ROM (*denotes pain) AROM PROM COMMENTS   Flexion         Extension         Sidebending Left         Sidebending Right         Rotation Left      []   Seated  []   Standing        Rotation Right                []   Seated  []   Standing              Sensation/Motor Function   [x] All dermatomes WNL except as marked below   [x] All  myotomes WNL except as marked below                                       Dermatome Left Right   Anterior groin, 2-3 inches below ASIS (L1-L2)       Middle third anterior thigh (L3)       Patella and med malleolus (L4)       Fibular head and dorsum of foot (L5)       Lateral side and plantar surface of foot (S1)       Medial aspect of posterior thigh (S2)       Perianal area (S3,4)                  Range of Motion/Strength Testing     [x] All ROM and strength WNL except as marked below   * Denotes limitation by pain              Range Tested AROM PROM MMT/Resisted     Left Right Left Right Left Right   Hip Flexion         4- 4   Hip Extension         3- 4-   Hip Abduction         3+ 4   Hip Adduction               Hip IR         3+ 4-   Hip ER         3+ 4-   Knee Flexion         4 4   Knee Extension         4 4 Ankle Dorsiflex               Ankle Plantarflex               Ankle Inversion               Ankle Eversion                  Flexibility     [x] All flexibility WNL except as marked below  Muscle Left Right   Hamstrings (90/90) []  WNL  [] Tight  [] NT []  WNL  [] Tight  [] NT   Gastroc []  WNL  [] Tight  [] NT []  WNL  [] Tight  [] NT   TFL/ITB (Nings) []  WNL  [] Tight  [] NT []  WNL  [] Tight  [] NT   Iliopsoas (Love Bun) []  WNL  [] Tight  [] NT []  WNL  [] Tight  [] NT   Piriformis []  WNL  [] Tight  [] NT []  WNL  [] Tight  [] NT         Reflexes/Trunk Strength    [x] All WNL except as marked below     Reflex Left Right Strength Strength   Quadriceps (L3,4)     Transv Abdominis     Achilles (S1,2)           Ankle clonus           Babinski                   ASSESSMENT:    After 9 aquatic PT sessions, pt is demosntrating mild improvements in gait, L hip strength, and functional activity tolerance. Recommend completion of aquatic POC for 12 weeks and then transition to land-based PT at 2x/week for 6 more weeks (total of 24 visits). Pt is also attempting to get a referral for vertigo as she deals with positional vertigo with lying down and rolling over.       Treatment/Activity Tolerance:   [x]Patient tolerated treatment well [] Patient limited by fatique  []Patient limited by pain [] Patient limited by other medical complications  [] Other:     GOALS  Short term goal 1: Pt will be indep in HEP - met  Short term goal 2: Pt will tolerate aquatic exercise for 30 minutes or more - met  Short term goal 3: Pt will increase B hip strength to 4/5 - not met, improved from evaluation but not 4/5  Short term goal 4: Pt will ambulate with improved propulsion B and less Trendelenberg - progressing     Short term goal 5: Pt will perform SLS for 5+ seconds B to indicate improved balance - progressing      Plan: [x] Continue per plan of care [] Alter current plan (see comments)   [] Plan of care initiated [] Hold pending MD visit [] Discharge      Plan for Next Session:  aquatic PT    Re-Certification Due Date:         Signature:  Paulie Vogel PT , DPT [id number]

## 2022-11-17 ENCOUNTER — HOSPITAL ENCOUNTER (OUTPATIENT)
Dept: PHYSICAL THERAPY | Age: 62
Setting detail: THERAPIES SERIES
Discharge: HOME | End: 2022-11-17
Payer: COMMERCIAL

## 2022-11-17 PROCEDURE — 97150 GROUP THERAPEUTIC PROCEDURES: CPT

## 2022-11-17 PROCEDURE — 97113 AQUATIC THERAPY/EXERCISES: CPT

## 2022-11-17 NOTE — FLOWSHEET NOTE
[facility], [address]   Phone: [phone]  Fax [phone]      Physical Therapy Aquatic Flow Sheet  Date:  11/17/2022    Patient Name:  Yanique Pyle    Restrictions:  Pt has a certified service dog that she brings with her  Medical/Treatment Diagnosis Information:   Intertrochanteric fracture of left femur, closed, with delayed healing, subsequent encounter [Q43.109W]  Insurance/Certification information:   Med [place]  Physician Information:   Aracelis Zhang MD  Plan of care signed (Y/N):  N  Visit# / total visits:  10/12    Pain level: 0/10   Electronically signed by:  Viola Lara PT, DPT    Medicare Cap (if applicable):  N/a = total amount used, updated 11/17/2022    Key  B= Belt DB= Dumbells T= Theratube   H= Hydrotone N= Noodles W= Weights   P= Paddles S= Speedo equipment K= Kickboard     Exercises/Activities   Warm-up/Amb    Exercises      Slow forward  x2laps  HR/TR  x20B    Slow sideways  x2laps  Marches  x2min    Slow backwards  x2laps  Mini-squats  x20    Medium forward    3-way SLR  x20B    Medium sideways    Hip circles/fig 8  x20B    Small shuffle    Hamstring curls  x20B    Jog    Knee extension  x20B    Braiding    Pelvic tilts  5\"x10    Bicycling  x2min  Scap squeezes  5\"x10        Shoulder flex/ext  x15B w/ TA w/ paddles    Functional    Shoulder abd/add  x15B w/ TAw/ paddles    Step    Shoulder H. abd/add  x15B w/ TAw/ paddles    Lifting    Shoulder IR/ER      Hand to opp knee    Rowing      Push down squat    Bilateral pull down      UE PNF    Push/pull      LE PNF    Push downs      Wall push ups    Arm circles      SLS    Elbow flex/ext          Chin tuck      Stretching    UT shrugs/rolls      Gastroc/Soleus  2v67dfg B  Rocking horse      Hamstring  2x48qsf B        SKTC  2x30 sec B  Other      Piriformis    tandem  1y30gmb B    Hip flexor          Ladder pull Pec stretch          Post deltoid           Time In:  2:45pm    Timed Code Treatment Minutes:  15min.     Total Treatment Minutes:  30min (1 group, 1 aquatic individual TE)    Treatment/Activity Tolerance:   [x] Patient tolerated treatment well [] Patient limited by fatigue   [] Patient limited by pain [] Patient limited by other medical complications  [] Other:     Prognosis: [x] Good [] Fair  [] Poor    Patient Requires Follow-up:  [x] Yes  [] No    Plan: [x] Continue per plan of care [] Alter current plan (see comments)   [] Plan of care initiated [] Hold pending MD visit [] Discharge    See Weekly Progress Note: [] Yes  [x] No  Next due:

## 2022-11-22 ENCOUNTER — HOSPITAL ENCOUNTER (OUTPATIENT)
Dept: PHYSICAL THERAPY | Age: 62
Setting detail: THERAPIES SERIES
Discharge: HOME | End: 2022-11-22
Payer: COMMERCIAL

## 2022-11-22 NOTE — FLOWSHEET NOTE
[facility], [facility],  [address] Gu, [address]   Phone: [phone]  Fax [phone]      Physical Therapy  Cancellation/No-show Note  Patient Name:  Rikki Lee  :  1960   Date:  2022  Cancels to date: 1  No-shows to date: 0    For today's appointment patient:  [x] Cancelled  [] Rescheduled appointment  [] No-show     Reason given by patient:  [] Patient ill  [] Conflicting appointment  [] No transportation    [] Conflict with work  [] No reason given  [x] Other:     Comments:  back spasm    Electronically signed by:  Sharmila Burnette PT

## 2022-11-29 ENCOUNTER — APPOINTMENT (OUTPATIENT)
Dept: PHYSICAL THERAPY | Age: 62
End: 2022-11-29
Payer: COMMERCIAL

## 2022-12-06 ENCOUNTER — HOSPITAL ENCOUNTER (OUTPATIENT)
Dept: PHYSICAL THERAPY | Age: 62
Setting detail: THERAPIES SERIES
Discharge: HOME | End: 2022-12-06
Payer: COMMERCIAL

## 2022-12-06 PROCEDURE — 97113 AQUATIC THERAPY/EXERCISES: CPT

## 2022-12-06 PROCEDURE — 97150 GROUP THERAPEUTIC PROCEDURES: CPT

## 2022-12-06 NOTE — FLOWSHEET NOTE
[facility], [facility], [address] [place] [address]   Phone: [phone]  Fax [phone]      Physical Therapy Aquatic Flow Sheet  Date:  12/6/2022    Patient Name:  Gunnar Lackey    Restrictions:  Pt has a certified service dog that she brings with her  Medical/Treatment Diagnosis Information:   Intertrochanteric fracture of left femur, closed, with delayed healing, subsequent encounter [F14.308Q]  Insurance/Certification information:   Med [place]  Physician Information:   Mariah Soares MD  Plan of care signed (Y/N):  N  Visit# / total visits:  11/24   Pain level: 0/10   Electronically signed by:  Zulay Cedeño PT, DPT    Medicare Cap (if applicable):  N/a = total amount used, updated 12/6/2022    Key  B= Belt DB= Dumbells T= Theratube   H= Hydrotone N= Noodles W= Weights   P= Paddles S= Speedo equipment K= Kickboard     Exercises/Activities   Warm-up/Amb    Exercises      Slow forward  x2laps  HR/TR  x25B    Slow sideways  x2laps  Marches  x2min    Slow backwards  x2laps  Mini-squats  x25    Medium forward    3-way SLR  x25B    Medium sideways    Hip circles/fig 8  x25B    Small shuffle    Hamstring curls  x25B    Jog    Knee extension  x25B    Braiding    Pelvic tilts  5\"x10    Bicycling  x2min  Scap squeezes  5\"x10        Shoulder flex/ext  x25B w/ TA w/ paddles    Functional    Shoulder abd/add  x25B w/ TAw/ paddles    Step    Shoulder H. abd/add  x25B w/ TAw/ paddles    Lifting    Shoulder IR/ER      Hand to opp knee    Rowing      Push down squat    Bilateral pull down      UE PNF    Push/pull      LE PNF    Push downs      Wall push ups    Arm circles      SLS    Elbow flex/ext          Chin tuck      Stretching    UT shrugs/rolls      Gastroc/Soleus  0l62qbz B  Rocking horse      Hamstring  9i87baz B        SKTC  2x30 sec B  Other      Piriformis    tandem  1i09aad B    Hip flexor          Ladder pull Pec stretch          Post deltoid           Time In:  2:00pm    Timed Code Treatment Minutes:  15min.     Total Treatment Minutes:  35min (1 group, 1 aquatic individual TE)    Treatment/Activity Tolerance:   [x] Patient tolerated treatment well [] Patient limited by fatigue   [] Patient limited by pain [] Patient limited by other medical complications  [] Other:     Prognosis: [x] Good [] Fair  [] Poor    Patient Requires Follow-up:  [x] Yes  [] No    Plan: [x] Continue per plan of care [] Alter current plan (see comments)   [] Plan of care initiated [] Hold pending MD visit [] Discharge    See Weekly Progress Note: [] Yes  [x] No  Next due:

## 2022-12-08 ENCOUNTER — HOSPITAL ENCOUNTER (OUTPATIENT)
Dept: PHYSICAL THERAPY | Age: 62
Setting detail: THERAPIES SERIES
Discharge: HOME | End: 2022-12-08
Payer: COMMERCIAL

## 2022-12-08 NOTE — FLOWSHEET NOTE
[facility], [facility], [address], [address]  Phone: [phone]  Fax [phone]      Physical Therapy  Cancellation/No-show Note  Patient Name:  Niels Sagastume  :  1960   Date:  2022  Cancels to date: 2  No-shows to date: 0    For today's appointment patient:  [x] Cancelled  [] Rescheduled appointment  [] No-show     Reason given by patient:  [x] Patient ill  [] Conflicting appointment  [] No transportation    [] Conflict with work  [] No reason given  [] Other:     Comments:      Electronically signed by:  Bogdan Tavarez PT

## 2022-12-13 ENCOUNTER — HOSPITAL ENCOUNTER (OUTPATIENT)
Dept: PHYSICAL THERAPY | Age: 62
Setting detail: THERAPIES SERIES
Discharge: HOME | End: 2022-12-13
Payer: COMMERCIAL

## 2022-12-13 NOTE — FLOWSHEET NOTE
[facility], [facility],  [address], [address]  Phone: [phone]  Fax [phone]      Physical Therapy  Cancellation/No-show Note  Patient Name:  Jenny Dolan  :  1960   Date:  2022  Cancels to date: 3  No-shows to date: 0    For today's appointment patient:  [x] Cancelled  [] Rescheduled appointment  [] No-show     Reason given by patient:  [x] Patient ill  [] Conflicting appointment  [] No transportation    [] Conflict with work  [] No reason given  [] Other:     Comments:      Electronically signed by:  Lucina Rizo PT

## 2022-12-15 ENCOUNTER — HOSPITAL ENCOUNTER (OUTPATIENT)
Dept: PHYSICAL THERAPY | Age: 62
Setting detail: THERAPIES SERIES
Discharge: HOME | End: 2022-12-15
Payer: COMMERCIAL

## 2022-12-20 ENCOUNTER — HOSPITAL ENCOUNTER (OUTPATIENT)
Dept: PHYSICAL THERAPY | Age: 62
Setting detail: THERAPIES SERIES
Discharge: HOME | End: 2022-12-20
Payer: COMMERCIAL

## 2022-12-20 NOTE — FLOWSHEET NOTE
[facility], [facility], [address], [address]  Phone: [phone]  Fax [phone]      Physical Therapy  Cancellation/No-show Note  Patient Name:  Sahara Appiah  :  1960   Date:  2022  Cancels to date: 5  No-shows to date: 0    For today's appointment patient:  [x] Cancelled  [] Rescheduled appointment  [] No-show     Reason given by patient:  [x] Patient ill  [] Conflicting appointment  [] No transportation    [] Conflict with work  [] No reason given  [] Other:     Comments:      Electronically signed by:  Mortimer Ellison, PT

## 2022-12-22 ENCOUNTER — APPOINTMENT (OUTPATIENT)
Dept: PHYSICAL THERAPY | Age: 62
End: 2022-12-22
Payer: COMMERCIAL

## 2022-12-27 ENCOUNTER — HOSPITAL ENCOUNTER (OUTPATIENT)
Dept: PHYSICAL THERAPY | Age: 62
Setting detail: THERAPIES SERIES
Discharge: HOME | End: 2022-12-27
Payer: COMMERCIAL

## 2022-12-29 ENCOUNTER — APPOINTMENT (OUTPATIENT)
Dept: PHYSICAL THERAPY | Age: 62
End: 2022-12-29
Payer: COMMERCIAL

## 2023-09-19 ENCOUNTER — OFFICE VISIT (OUTPATIENT)
Dept: URGENT CARE | Age: 63
End: 2023-09-19

## 2023-09-19 VITALS
DIASTOLIC BLOOD PRESSURE: 82 MMHG | RESPIRATION RATE: 16 BRPM | WEIGHT: 202 LBS | TEMPERATURE: 98.8 F | BODY MASS INDEX: 34.49 KG/M2 | OXYGEN SATURATION: 96 % | HEIGHT: 64 IN | HEART RATE: 97 BPM | SYSTOLIC BLOOD PRESSURE: 132 MMHG

## 2023-09-19 DIAGNOSIS — J01.90 ACUTE BACTERIAL SINUSITIS: Primary | ICD-10-CM

## 2023-09-19 DIAGNOSIS — B96.89 ACUTE BACTERIAL SINUSITIS: Primary | ICD-10-CM

## 2023-09-19 DIAGNOSIS — R05.1 ACUTE COUGH: ICD-10-CM

## 2023-09-19 LAB
Lab: NORMAL
PERFORMING INSTRUMENT: NORMAL
QC PASS/FAIL: NORMAL
SARS-COV-2, POC: NORMAL

## 2023-09-19 RX ORDER — DOXYCYCLINE HYCLATE 100 MG
100 TABLET ORAL 2 TIMES DAILY
Qty: 14 TABLET | Refills: 0 | Status: SHIPPED | OUTPATIENT
Start: 2023-09-19 | End: 2023-09-26

## 2023-09-19 RX ORDER — DEXTROMETHORPHAN HYDROBROMIDE AND PROMETHAZINE HYDROCHLORIDE 15; 6.25 MG/5ML; MG/5ML
5 SYRUP ORAL
Qty: 35 ML | Refills: 0 | Status: SHIPPED | OUTPATIENT
Start: 2023-09-19 | End: 2023-09-26

## 2023-09-19 RX ORDER — MELOXICAM 15 MG/1
15 TABLET ORAL
COMMUNITY

## 2023-09-19 RX ORDER — BENZONATATE 200 MG/1
200 CAPSULE ORAL 3 TIMES DAILY PRN
Qty: 21 CAPSULE | Refills: 0 | Status: SHIPPED | OUTPATIENT
Start: 2023-09-19 | End: 2023-09-26

## 2023-09-19 ASSESSMENT — ENCOUNTER SYMPTOMS
CHEST TIGHTNESS: 1
CHOKING: 0
SINUS PRESSURE: 1
DIARRHEA: 1
COUGH: 1
SINUS PAIN: 1
VOMITING: 0
ALLERGIC/IMMUNOLOGIC NEGATIVE: 1
SORE THROAT: 0
WHEEZING: 0
NAUSEA: 0
SHORTNESS OF BREATH: 0
APNEA: 0
STRIDOR: 0

## 2024-06-01 ENCOUNTER — HOSPITAL ENCOUNTER (OUTPATIENT)
Age: 64
Setting detail: SPECIMEN
Discharge: HOME | End: 2024-06-01
Payer: COMMERCIAL

## 2024-06-01 LAB — VANCOMYCIN TROUGH SERPL-MCNC: 14.9 UG/ML (ref 10–20)

## 2024-06-01 PROCEDURE — 36415 COLL VENOUS BLD VENIPUNCTURE: CPT

## 2024-06-01 PROCEDURE — 80202 ASSAY OF VANCOMYCIN: CPT

## 2024-06-04 ENCOUNTER — HOSPITAL ENCOUNTER (OUTPATIENT)
Age: 64
Setting detail: SPECIMEN
Discharge: HOME | End: 2024-06-04
Payer: COMMERCIAL

## 2024-06-04 LAB
ALBUMIN SERPL-MCNC: 3.8 G/DL (ref 3.4–5)
ALBUMIN/GLOB SERPL: 1.5 {RATIO} (ref 1.1–2.2)
ALP SERPL-CCNC: 121 U/L (ref 40–129)
ALT SERPL-CCNC: 15 U/L (ref 10–40)
ANION GAP SERPL CALCULATED.3IONS-SCNC: 12 MMOL/L (ref 3–16)
AST SERPL-CCNC: 17 U/L (ref 15–37)
BASOPHILS # BLD: 0.1 K/UL (ref 0–0.2)
BASOPHILS NFR BLD: 0.6 %
BILIRUB SERPL-MCNC: <0.2 MG/DL (ref 0–1)
BUN SERPL-MCNC: 11 MG/DL (ref 7–20)
CALCIUM SERPL-MCNC: 9.3 MG/DL (ref 8.3–10.6)
CHLORIDE SERPL-SCNC: 104 MMOL/L (ref 99–110)
CO2 SERPL-SCNC: 24 MMOL/L (ref 21–32)
CREAT SERPL-MCNC: 0.6 MG/DL (ref 0.6–1.2)
CRP SERPL-MCNC: 16.1 MG/L (ref 0–5.1)
DEPRECATED RDW RBC AUTO: 14.5 % (ref 12.4–15.4)
EOSINOPHIL # BLD: 0.6 K/UL (ref 0–0.6)
EOSINOPHIL NFR BLD: 5.8 %
ERYTHROCYTE [SEDIMENTATION RATE] IN BLOOD BY WESTERGREN METHOD: 55 MM/HR (ref 0–30)
GFR SERPLBLD CREATININE-BSD FMLA CKD-EPI: >90 ML/MIN/{1.73_M2}
GLUCOSE SERPL-MCNC: 88 MG/DL (ref 70–99)
HCT VFR BLD AUTO: 38.4 % (ref 36–48)
HGB BLD-MCNC: 12.5 G/DL (ref 12–16)
LYMPHOCYTES # BLD: 3 K/UL (ref 1–5.1)
LYMPHOCYTES NFR BLD: 28.6 %
MCH RBC QN AUTO: 30.3 PG (ref 26–34)
MCHC RBC AUTO-ENTMCNC: 32.5 G/DL (ref 31–36)
MCV RBC AUTO: 93.2 FL (ref 80–100)
MONOCYTES # BLD: 0.8 K/UL (ref 0–1.3)
MONOCYTES NFR BLD: 7.6 %
NEUTROPHILS # BLD: 6 K/UL (ref 1.7–7.7)
NEUTROPHILS NFR BLD: 57.4 %
PLATELET # BLD AUTO: 386 K/UL (ref 135–450)
PMV BLD AUTO: 7.2 FL (ref 5–10.5)
POTASSIUM SERPL-SCNC: 4.7 MMOL/L (ref 3.5–5.1)
PROT SERPL-MCNC: 6.3 G/DL (ref 6.4–8.2)
RBC # BLD AUTO: 4.12 M/UL (ref 4–5.2)
SODIUM SERPL-SCNC: 140 MMOL/L (ref 136–145)
VANCOMYCIN TROUGH SERPL-MCNC: 5.3 UG/ML (ref 10–20)
WBC # BLD AUTO: 10.4 K/UL (ref 4–11)

## 2024-06-04 PROCEDURE — 80202 ASSAY OF VANCOMYCIN: CPT

## 2024-06-04 PROCEDURE — 86140 C-REACTIVE PROTEIN: CPT

## 2024-06-04 PROCEDURE — 85652 RBC SED RATE AUTOMATED: CPT

## 2024-06-04 PROCEDURE — 85025 COMPLETE CBC W/AUTO DIFF WBC: CPT

## 2024-06-04 PROCEDURE — 80053 COMPREHEN METABOLIC PANEL: CPT

## 2024-06-04 PROCEDURE — 36415 COLL VENOUS BLD VENIPUNCTURE: CPT

## 2024-06-06 ENCOUNTER — HOSPITAL ENCOUNTER (OUTPATIENT)
Age: 64
Setting detail: SPECIMEN
Discharge: HOME | End: 2024-06-06
Payer: COMMERCIAL

## 2024-06-06 LAB
ANION GAP SERPL CALCULATED.3IONS-SCNC: 12 MMOL/L (ref 3–16)
BUN SERPL-MCNC: 13 MG/DL (ref 7–20)
CALCIUM SERPL-MCNC: 9.2 MG/DL (ref 8.3–10.6)
CHLORIDE SERPL-SCNC: 103 MMOL/L (ref 99–110)
CO2 SERPL-SCNC: 24 MMOL/L (ref 21–32)
CREAT SERPL-MCNC: 0.6 MG/DL (ref 0.6–1.2)
GFR SERPLBLD CREATININE-BSD FMLA CKD-EPI: >90 ML/MIN/{1.73_M2}
GLUCOSE SERPL-MCNC: 90 MG/DL (ref 70–99)
POTASSIUM SERPL-SCNC: 4.3 MMOL/L (ref 3.5–5.1)
SODIUM SERPL-SCNC: 139 MMOL/L (ref 136–145)
VANCOMYCIN TROUGH SERPL-MCNC: 10 UG/ML (ref 10–20)

## 2024-06-06 PROCEDURE — 36415 COLL VENOUS BLD VENIPUNCTURE: CPT

## 2024-06-06 PROCEDURE — 80048 BASIC METABOLIC PNL TOTAL CA: CPT

## 2024-06-06 PROCEDURE — 80202 ASSAY OF VANCOMYCIN: CPT

## 2024-06-10 ENCOUNTER — HOSPITAL ENCOUNTER (OUTPATIENT)
Age: 64
Setting detail: SPECIMEN
Discharge: HOME | End: 2024-06-10
Payer: COMMERCIAL

## 2024-06-10 LAB
ALBUMIN SERPL-MCNC: 4.2 G/DL (ref 3.4–5)
ALBUMIN/GLOB SERPL: 1.8 {RATIO} (ref 1.1–2.2)
ALP SERPL-CCNC: 149 U/L (ref 40–129)
ALT SERPL-CCNC: 14 U/L (ref 10–40)
ANION GAP SERPL CALCULATED.3IONS-SCNC: 13 MMOL/L (ref 3–16)
AST SERPL-CCNC: 16 U/L (ref 15–37)
BASOPHILS # BLD: 0.1 K/UL (ref 0–0.2)
BASOPHILS NFR BLD: 1 %
BILIRUB SERPL-MCNC: 0.3 MG/DL (ref 0–1)
BUN SERPL-MCNC: 16 MG/DL (ref 7–20)
CALCIUM SERPL-MCNC: 10 MG/DL (ref 8.3–10.6)
CHLORIDE SERPL-SCNC: 104 MMOL/L (ref 99–110)
CO2 SERPL-SCNC: 24 MMOL/L (ref 21–32)
CREAT SERPL-MCNC: 0.7 MG/DL (ref 0.6–1.2)
CRP SERPL-MCNC: 7.8 MG/L (ref 0–5.1)
DEPRECATED RDW RBC AUTO: 14.3 % (ref 12.4–15.4)
EOSINOPHIL # BLD: 0.4 K/UL (ref 0–0.6)
EOSINOPHIL NFR BLD: 3.5 %
ERYTHROCYTE [SEDIMENTATION RATE] IN BLOOD BY WESTERGREN METHOD: 48 MM/HR (ref 0–30)
GFR SERPLBLD CREATININE-BSD FMLA CKD-EPI: >90 ML/MIN/{1.73_M2}
GLUCOSE SERPL-MCNC: 104 MG/DL (ref 70–99)
HCT VFR BLD AUTO: 35.8 % (ref 36–48)
HGB BLD-MCNC: 11.8 G/DL (ref 12–16)
LYMPHOCYTES # BLD: 2.6 K/UL (ref 1–5.1)
LYMPHOCYTES NFR BLD: 25.3 %
MCH RBC QN AUTO: 30.1 PG (ref 26–34)
MCHC RBC AUTO-ENTMCNC: 32.9 G/DL (ref 31–36)
MCV RBC AUTO: 91.5 FL (ref 80–100)
MONOCYTES # BLD: 0.5 K/UL (ref 0–1.3)
MONOCYTES NFR BLD: 4.7 %
NEUTROPHILS # BLD: 6.7 K/UL (ref 1.7–7.7)
NEUTROPHILS NFR BLD: 65.5 %
PLATELET # BLD AUTO: 447 K/UL (ref 135–450)
PMV BLD AUTO: 7.4 FL (ref 5–10.5)
POTASSIUM SERPL-SCNC: 4.6 MMOL/L (ref 3.5–5.1)
PROT SERPL-MCNC: 6.6 G/DL (ref 6.4–8.2)
RBC # BLD AUTO: 3.91 M/UL (ref 4–5.2)
SODIUM SERPL-SCNC: 141 MMOL/L (ref 136–145)
VANCOMYCIN TROUGH SERPL-MCNC: 12.5 UG/ML (ref 10–20)
WBC # BLD AUTO: 10.2 K/UL (ref 4–11)

## 2024-06-10 PROCEDURE — 80053 COMPREHEN METABOLIC PANEL: CPT

## 2024-06-10 PROCEDURE — 86140 C-REACTIVE PROTEIN: CPT

## 2024-06-10 PROCEDURE — 85025 COMPLETE CBC W/AUTO DIFF WBC: CPT

## 2024-06-10 PROCEDURE — 36415 COLL VENOUS BLD VENIPUNCTURE: CPT

## 2024-06-10 PROCEDURE — 80202 ASSAY OF VANCOMYCIN: CPT

## 2024-06-10 PROCEDURE — 85652 RBC SED RATE AUTOMATED: CPT

## 2024-06-14 ENCOUNTER — HOSPITAL ENCOUNTER (OUTPATIENT)
Age: 64
Discharge: HOME | End: 2024-06-14
Payer: COMMERCIAL

## 2024-06-14 LAB
ANION GAP SERPL CALCULATED.3IONS-SCNC: 12 MMOL/L (ref 3–16)
BUN SERPL-MCNC: 16 MG/DL (ref 7–20)
CALCIUM SERPL-MCNC: 9 MG/DL (ref 8.3–10.6)
CHLORIDE SERPL-SCNC: 104 MMOL/L (ref 99–110)
CO2 SERPL-SCNC: 22 MMOL/L (ref 21–32)
CREAT SERPL-MCNC: 0.6 MG/DL (ref 0.6–1.2)
GFR SERPLBLD CREATININE-BSD FMLA CKD-EPI: >90 ML/MIN/{1.73_M2}
GLUCOSE SERPL-MCNC: 94 MG/DL (ref 70–99)
POTASSIUM SERPL-SCNC: 4.2 MMOL/L (ref 3.5–5.1)
SODIUM SERPL-SCNC: 138 MMOL/L (ref 136–145)
VANCOMYCIN TROUGH SERPL-MCNC: 14.5 UG/ML (ref 10–20)

## 2024-06-14 PROCEDURE — 80048 BASIC METABOLIC PNL TOTAL CA: CPT

## 2024-06-14 PROCEDURE — 80202 ASSAY OF VANCOMYCIN: CPT

## 2024-06-14 PROCEDURE — 36415 COLL VENOUS BLD VENIPUNCTURE: CPT

## 2024-06-19 ENCOUNTER — HOSPITAL ENCOUNTER (OUTPATIENT)
Age: 64
Setting detail: SPECIMEN
Discharge: HOME | End: 2024-06-19
Payer: COMMERCIAL

## 2024-06-19 LAB
ALBUMIN SERPL-MCNC: 4 G/DL (ref 3.4–5)
ALBUMIN/GLOB SERPL: 1.5 {RATIO} (ref 1.1–2.2)
ALP SERPL-CCNC: 129 U/L (ref 40–129)
ALT SERPL-CCNC: 12 U/L (ref 10–40)
ANION GAP SERPL CALCULATED.3IONS-SCNC: 10 MMOL/L (ref 3–16)
AST SERPL-CCNC: 18 U/L (ref 15–37)
BASOPHILS # BLD: 0.1 K/UL (ref 0–0.2)
BASOPHILS NFR BLD: 1.1 %
BILIRUB SERPL-MCNC: 0.4 MG/DL (ref 0–1)
BUN SERPL-MCNC: 16 MG/DL (ref 7–20)
CALCIUM SERPL-MCNC: 9.5 MG/DL (ref 8.3–10.6)
CHLORIDE SERPL-SCNC: 103 MMOL/L (ref 99–110)
CO2 SERPL-SCNC: 24 MMOL/L (ref 21–32)
CREAT SERPL-MCNC: 0.6 MG/DL (ref 0.6–1.2)
CRP SERPL-MCNC: 8.3 MG/L (ref 0–5.1)
DEPRECATED RDW RBC AUTO: 14.7 % (ref 12.4–15.4)
EOSINOPHIL # BLD: 0.4 K/UL (ref 0–0.6)
EOSINOPHIL NFR BLD: 6.6 %
ERYTHROCYTE [SEDIMENTATION RATE] IN BLOOD BY WESTERGREN METHOD: 33 MM/HR (ref 0–30)
GFR SERPLBLD CREATININE-BSD FMLA CKD-EPI: >90 ML/MIN/{1.73_M2}
GLUCOSE SERPL-MCNC: 91 MG/DL (ref 70–99)
HCT VFR BLD AUTO: 35.3 % (ref 36–48)
HGB BLD-MCNC: 11.7 G/DL (ref 12–16)
LYMPHOCYTES # BLD: 1.6 K/UL (ref 1–5.1)
LYMPHOCYTES NFR BLD: 27.3 %
MCH RBC QN AUTO: 30.3 PG (ref 26–34)
MCHC RBC AUTO-ENTMCNC: 33.1 G/DL (ref 31–36)
MCV RBC AUTO: 91.6 FL (ref 80–100)
MONOCYTES # BLD: 0.5 K/UL (ref 0–1.3)
MONOCYTES NFR BLD: 9.1 %
NEUTROPHILS # BLD: 3.2 K/UL (ref 1.7–7.7)
NEUTROPHILS NFR BLD: 55.9 %
PLATELET # BLD AUTO: 354 K/UL (ref 135–450)
PMV BLD AUTO: 7.5 FL (ref 5–10.5)
POTASSIUM SERPL-SCNC: 4.2 MMOL/L (ref 3.5–5.1)
PROT SERPL-MCNC: 6.6 G/DL (ref 6.4–8.2)
RBC # BLD AUTO: 3.85 M/UL (ref 4–5.2)
SODIUM SERPL-SCNC: 137 MMOL/L (ref 136–145)
VANCOMYCIN TROUGH SERPL-MCNC: 10.4 UG/ML (ref 10–20)
WBC # BLD AUTO: 5.8 K/UL (ref 4–11)

## 2024-06-19 PROCEDURE — 86140 C-REACTIVE PROTEIN: CPT

## 2024-06-19 PROCEDURE — 85652 RBC SED RATE AUTOMATED: CPT

## 2024-06-19 PROCEDURE — 85025 COMPLETE CBC W/AUTO DIFF WBC: CPT

## 2024-06-19 PROCEDURE — 80202 ASSAY OF VANCOMYCIN: CPT

## 2024-06-19 PROCEDURE — 36415 COLL VENOUS BLD VENIPUNCTURE: CPT

## 2024-06-19 PROCEDURE — 80053 COMPREHEN METABOLIC PANEL: CPT

## 2024-06-25 ENCOUNTER — HOSPITAL ENCOUNTER (OUTPATIENT)
Age: 64
Setting detail: SPECIMEN
Discharge: HOME | End: 2024-06-25
Payer: COMMERCIAL

## 2024-06-25 LAB
ALBUMIN SERPL-MCNC: 4 G/DL (ref 3.4–5)
ALBUMIN/GLOB SERPL: 1.4 {RATIO} (ref 1.1–2.2)
ALP SERPL-CCNC: 129 U/L (ref 40–129)
ALT SERPL-CCNC: 14 U/L (ref 10–40)
ANION GAP SERPL CALCULATED.3IONS-SCNC: 13 MMOL/L (ref 3–16)
AST SERPL-CCNC: 19 U/L (ref 15–37)
BASOPHILS # BLD: 0 K/UL (ref 0–0.2)
BASOPHILS NFR BLD: 0.6 %
BILIRUB SERPL-MCNC: <0.2 MG/DL (ref 0–1)
BUN SERPL-MCNC: 15 MG/DL (ref 7–20)
CALCIUM SERPL-MCNC: 9.1 MG/DL (ref 8.3–10.6)
CHLORIDE SERPL-SCNC: 105 MMOL/L (ref 99–110)
CO2 SERPL-SCNC: 22 MMOL/L (ref 21–32)
CREAT SERPL-MCNC: 0.6 MG/DL (ref 0.6–1.2)
CRP SERPL-MCNC: 11.1 MG/L (ref 0–5.1)
DEPRECATED RDW RBC AUTO: 15 % (ref 12.4–15.4)
EOSINOPHIL # BLD: 0.4 K/UL (ref 0–0.6)
EOSINOPHIL NFR BLD: 6.6 %
ERYTHROCYTE [SEDIMENTATION RATE] IN BLOOD BY WESTERGREN METHOD: 44 MM/HR (ref 0–30)
GFR SERPLBLD CREATININE-BSD FMLA CKD-EPI: >90 ML/MIN/{1.73_M2}
GLUCOSE SERPL-MCNC: 80 MG/DL (ref 70–99)
HCT VFR BLD AUTO: 36.3 % (ref 36–48)
HGB BLD-MCNC: 11.9 G/DL (ref 12–16)
LYMPHOCYTES # BLD: 1.9 K/UL (ref 1–5.1)
LYMPHOCYTES NFR BLD: 28.1 %
MCH RBC QN AUTO: 30.1 PG (ref 26–34)
MCHC RBC AUTO-ENTMCNC: 32.7 G/DL (ref 31–36)
MCV RBC AUTO: 92 FL (ref 80–100)
MONOCYTES # BLD: 0.7 K/UL (ref 0–1.3)
MONOCYTES NFR BLD: 9.8 %
NEUTROPHILS # BLD: 3.7 K/UL (ref 1.7–7.7)
NEUTROPHILS NFR BLD: 54.9 %
PLATELET # BLD AUTO: 276 K/UL (ref 135–450)
PMV BLD AUTO: 7.5 FL (ref 5–10.5)
POTASSIUM SERPL-SCNC: 3.9 MMOL/L (ref 3.5–5.1)
PROT SERPL-MCNC: 6.9 G/DL (ref 6.4–8.2)
RBC # BLD AUTO: 3.95 M/UL (ref 4–5.2)
SODIUM SERPL-SCNC: 140 MMOL/L (ref 136–145)
VANCOMYCIN TROUGH SERPL-MCNC: 14.8 UG/ML (ref 10–20)
WBC # BLD AUTO: 6.7 K/UL (ref 4–11)

## 2024-06-25 PROCEDURE — 86140 C-REACTIVE PROTEIN: CPT

## 2024-06-25 PROCEDURE — 85652 RBC SED RATE AUTOMATED: CPT

## 2024-06-25 PROCEDURE — 85025 COMPLETE CBC W/AUTO DIFF WBC: CPT

## 2024-06-25 PROCEDURE — 80202 ASSAY OF VANCOMYCIN: CPT

## 2024-06-25 PROCEDURE — 80053 COMPREHEN METABOLIC PANEL: CPT

## 2024-06-28 ENCOUNTER — HOSPITAL ENCOUNTER (OUTPATIENT)
Age: 64
Setting detail: SPECIMEN
Discharge: HOME | End: 2024-06-28
Payer: COMMERCIAL

## 2024-06-28 LAB
ANION GAP SERPL CALCULATED.3IONS-SCNC: 12 MMOL/L (ref 3–16)
BUN SERPL-MCNC: 16 MG/DL (ref 7–20)
CALCIUM SERPL-MCNC: 9.4 MG/DL (ref 8.3–10.6)
CHLORIDE SERPL-SCNC: 107 MMOL/L (ref 99–110)
CO2 SERPL-SCNC: 24 MMOL/L (ref 21–32)
CREAT SERPL-MCNC: 0.6 MG/DL (ref 0.6–1.2)
GFR SERPLBLD CREATININE-BSD FMLA CKD-EPI: >90 ML/MIN/{1.73_M2}
GLUCOSE SERPL-MCNC: 88 MG/DL (ref 70–99)
POTASSIUM SERPL-SCNC: 4.1 MMOL/L (ref 3.5–5.1)
SODIUM SERPL-SCNC: 143 MMOL/L (ref 136–145)
VANCOMYCIN TROUGH SERPL-MCNC: 15.5 UG/ML (ref 10–20)

## 2024-06-28 PROCEDURE — 36415 COLL VENOUS BLD VENIPUNCTURE: CPT

## 2024-06-28 PROCEDURE — 80048 BASIC METABOLIC PNL TOTAL CA: CPT

## 2024-06-28 PROCEDURE — 80202 ASSAY OF VANCOMYCIN: CPT

## 2024-07-01 ENCOUNTER — HOSPITAL ENCOUNTER (OUTPATIENT)
Age: 64
Setting detail: SPECIMEN
Discharge: HOME | End: 2024-07-01
Payer: COMMERCIAL

## 2024-07-01 LAB
ALBUMIN SERPL-MCNC: 3.9 G/DL (ref 3.4–5)
ALBUMIN/GLOB SERPL: 1.5 {RATIO} (ref 1.1–2.2)
ALP SERPL-CCNC: 135 U/L (ref 40–129)
ALT SERPL-CCNC: 19 U/L (ref 10–40)
ANION GAP SERPL CALCULATED.3IONS-SCNC: 12 MMOL/L (ref 3–16)
AST SERPL-CCNC: 21 U/L (ref 15–37)
BASOPHILS # BLD: 0.1 K/UL (ref 0–0.2)
BASOPHILS NFR BLD: 1.3 %
BILIRUB SERPL-MCNC: <0.2 MG/DL (ref 0–1)
BUN SERPL-MCNC: 17 MG/DL (ref 7–20)
CALCIUM SERPL-MCNC: 9.4 MG/DL (ref 8.3–10.6)
CHLORIDE SERPL-SCNC: 108 MMOL/L (ref 99–110)
CO2 SERPL-SCNC: 23 MMOL/L (ref 21–32)
CREAT SERPL-MCNC: 0.6 MG/DL (ref 0.6–1.2)
CRP SERPL-MCNC: 6.7 MG/L (ref 0–5.1)
DEPRECATED RDW RBC AUTO: 14.9 % (ref 12.4–15.4)
EOSINOPHIL # BLD: 0.4 K/UL (ref 0–0.6)
EOSINOPHIL NFR BLD: 6.3 %
ERYTHROCYTE [SEDIMENTATION RATE] IN BLOOD BY WESTERGREN METHOD: 26 MM/HR (ref 0–30)
GFR SERPLBLD CREATININE-BSD FMLA CKD-EPI: >90 ML/MIN/{1.73_M2}
GLUCOSE SERPL-MCNC: 89 MG/DL (ref 70–99)
HCT VFR BLD AUTO: 35.3 % (ref 36–48)
HGB BLD-MCNC: 11.8 G/DL (ref 12–16)
LYMPHOCYTES # BLD: 1.9 K/UL (ref 1–5.1)
LYMPHOCYTES NFR BLD: 34 %
MCH RBC QN AUTO: 30 PG (ref 26–34)
MCHC RBC AUTO-ENTMCNC: 33.3 G/DL (ref 31–36)
MCV RBC AUTO: 89.9 FL (ref 80–100)
MONOCYTES # BLD: 0.6 K/UL (ref 0–1.3)
MONOCYTES NFR BLD: 10.3 %
NEUTROPHILS # BLD: 2.8 K/UL (ref 1.7–7.7)
NEUTROPHILS NFR BLD: 48.1 %
PLATELET # BLD AUTO: 309 K/UL (ref 135–450)
PMV BLD AUTO: 7.7 FL (ref 5–10.5)
POTASSIUM SERPL-SCNC: 4.1 MMOL/L (ref 3.5–5.1)
PROT SERPL-MCNC: 6.5 G/DL (ref 6.4–8.2)
RBC # BLD AUTO: 3.93 M/UL (ref 4–5.2)
SODIUM SERPL-SCNC: 143 MMOL/L (ref 136–145)
VANCOMYCIN TROUGH SERPL-MCNC: 25.3 UG/ML (ref 10–20)
WBC # BLD AUTO: 5.7 K/UL (ref 4–11)

## 2024-07-01 PROCEDURE — 36415 COLL VENOUS BLD VENIPUNCTURE: CPT

## 2024-07-01 PROCEDURE — 80053 COMPREHEN METABOLIC PANEL: CPT

## 2024-07-01 PROCEDURE — 80202 ASSAY OF VANCOMYCIN: CPT

## 2024-07-01 PROCEDURE — 86140 C-REACTIVE PROTEIN: CPT

## 2024-07-01 PROCEDURE — 85652 RBC SED RATE AUTOMATED: CPT

## 2024-07-01 PROCEDURE — 85025 COMPLETE CBC W/AUTO DIFF WBC: CPT

## 2024-07-08 ENCOUNTER — HOSPITAL ENCOUNTER (OUTPATIENT)
Age: 64
Setting detail: SPECIMEN
Discharge: HOME | End: 2024-07-08
Payer: COMMERCIAL

## 2024-07-08 LAB
ALBUMIN SERPL-MCNC: 4.1 G/DL (ref 3.4–5)
ALBUMIN/GLOB SERPL: 1.6 {RATIO} (ref 1.1–2.2)
ALP SERPL-CCNC: 130 U/L (ref 40–129)
ALT SERPL-CCNC: 24 U/L (ref 10–40)
ANION GAP SERPL CALCULATED.3IONS-SCNC: 14 MMOL/L (ref 3–16)
AST SERPL-CCNC: 26 U/L (ref 15–37)
BASOPHILS # BLD: 0 K/UL (ref 0–0.2)
BASOPHILS NFR BLD: 0.5 %
BILIRUB SERPL-MCNC: <0.2 MG/DL (ref 0–1)
BUN SERPL-MCNC: 17 MG/DL (ref 7–20)
CALCIUM SERPL-MCNC: 9.2 MG/DL (ref 8.3–10.6)
CHLORIDE SERPL-SCNC: 105 MMOL/L (ref 99–110)
CO2 SERPL-SCNC: 22 MMOL/L (ref 21–32)
CREAT SERPL-MCNC: 0.7 MG/DL (ref 0.6–1.2)
CRP SERPL-MCNC: 5.7 MG/L (ref 0–5.1)
DEPRECATED RDW RBC AUTO: 15.2 % (ref 12.4–15.4)
EOSINOPHIL # BLD: 0.2 K/UL (ref 0–0.6)
EOSINOPHIL NFR BLD: 3.4 %
ERYTHROCYTE [SEDIMENTATION RATE] IN BLOOD BY WESTERGREN METHOD: 28 MM/HR (ref 0–30)
GFR SERPLBLD CREATININE-BSD FMLA CKD-EPI: >90 ML/MIN/{1.73_M2}
GLUCOSE SERPL-MCNC: 126 MG/DL (ref 70–99)
HCT VFR BLD AUTO: 36.9 % (ref 36–48)
HGB BLD-MCNC: 12.1 G/DL (ref 12–16)
LYMPHOCYTES # BLD: 1.8 K/UL (ref 1–5.1)
LYMPHOCYTES NFR BLD: 29.1 %
MCH RBC QN AUTO: 30.1 PG (ref 26–34)
MCHC RBC AUTO-ENTMCNC: 32.7 G/DL (ref 31–36)
MCV RBC AUTO: 92 FL (ref 80–100)
MONOCYTES # BLD: 0.5 K/UL (ref 0–1.3)
MONOCYTES NFR BLD: 7.9 %
NEUTROPHILS # BLD: 3.6 K/UL (ref 1.7–7.7)
NEUTROPHILS NFR BLD: 59.1 %
PLATELET # BLD AUTO: 352 K/UL (ref 135–450)
PMV BLD AUTO: 7.6 FL (ref 5–10.5)
POTASSIUM SERPL-SCNC: 3.4 MMOL/L (ref 3.5–5.1)
PROT SERPL-MCNC: 6.7 G/DL (ref 6.4–8.2)
RBC # BLD AUTO: 4.01 M/UL (ref 4–5.2)
SODIUM SERPL-SCNC: 141 MMOL/L (ref 136–145)
VANCOMYCIN TROUGH SERPL-MCNC: 17.3 UG/ML (ref 10–20)
WBC # BLD AUTO: 6.2 K/UL (ref 4–11)

## 2024-07-08 PROCEDURE — 80202 ASSAY OF VANCOMYCIN: CPT

## 2024-07-08 PROCEDURE — 86140 C-REACTIVE PROTEIN: CPT

## 2024-07-08 PROCEDURE — 85025 COMPLETE CBC W/AUTO DIFF WBC: CPT

## 2024-07-08 PROCEDURE — 85652 RBC SED RATE AUTOMATED: CPT

## 2024-07-08 PROCEDURE — 36415 COLL VENOUS BLD VENIPUNCTURE: CPT

## 2024-07-08 PROCEDURE — 80053 COMPREHEN METABOLIC PANEL: CPT

## 2024-08-21 NOTE — FLOWSHEET NOTE
[place]  [address] [facility], [place] [address] [address]  [address] [facility], [address] [place] [address]  Phone: [phone]  Fax [phone]      Physical Therapy  Cancellation/No-show Note  Patient Name:  Maryalice Severin  :  1960   Date:  12/15/2022  Cancels to date: 4  No-shows to date: 0    For today's appointment patient:  [x] Cancelled  [] Rescheduled appointment  [] No-show     Reason given by patient:  [x] Patient ill  [] Conflicting appointment  [] No transportation    [] Conflict with work  [] No reason given  [] Other:     Comments:      Electronically signed by:  Diony Espinoza PT
DISPLAY PLAN FREE TEXT